# Patient Record
Sex: FEMALE | Race: WHITE | NOT HISPANIC OR LATINO
[De-identification: names, ages, dates, MRNs, and addresses within clinical notes are randomized per-mention and may not be internally consistent; named-entity substitution may affect disease eponyms.]

---

## 2024-02-06 PROBLEM — Z00.00 ENCOUNTER FOR PREVENTIVE HEALTH EXAMINATION: Status: ACTIVE | Noted: 2024-02-06

## 2024-02-15 ENCOUNTER — NON-APPOINTMENT (OUTPATIENT)
Age: 64
End: 2024-02-15

## 2024-02-20 ENCOUNTER — APPOINTMENT (OUTPATIENT)
Dept: OTOLARYNGOLOGY | Facility: CLINIC | Age: 64
End: 2024-02-20
Payer: MEDICARE

## 2024-02-20 VITALS — WEIGHT: 162 LBS | BODY MASS INDEX: 27.66 KG/M2 | HEIGHT: 64 IN

## 2024-02-20 DIAGNOSIS — Z80.9 FAMILY HISTORY OF MALIGNANT NEOPLASM, UNSPECIFIED: ICD-10-CM

## 2024-02-20 DIAGNOSIS — Z78.9 OTHER SPECIFIED HEALTH STATUS: ICD-10-CM

## 2024-02-20 DIAGNOSIS — Z92.89 PERSONAL HISTORY OF OTHER MEDICAL TREATMENT: ICD-10-CM

## 2024-02-20 DIAGNOSIS — Z82.0 FAMILY HISTORY OF EPILEPSY AND OTHER DISEASES OF THE NERVOUS SYSTEM: ICD-10-CM

## 2024-02-20 DIAGNOSIS — Z86.79 PERSONAL HISTORY OF OTHER DISEASES OF THE CIRCULATORY SYSTEM: ICD-10-CM

## 2024-02-20 DIAGNOSIS — Z82.5 FAMILY HISTORY OF ASTHMA AND OTHER CHRONIC LOWER RESPIRATORY DISEASES: ICD-10-CM

## 2024-02-20 DIAGNOSIS — Z87.448 PERSONAL HISTORY OF OTHER DISEASES OF URINARY SYSTEM: ICD-10-CM

## 2024-02-20 DIAGNOSIS — Z82.49 FAMILY HISTORY OF ISCHEMIC HEART DISEASE AND OTHER DISEASES OF THE CIRCULATORY SYSTEM: ICD-10-CM

## 2024-02-20 PROCEDURE — 31575 DIAGNOSTIC LARYNGOSCOPY: CPT

## 2024-02-20 PROCEDURE — 99203 OFFICE O/P NEW LOW 30 MIN: CPT | Mod: 25

## 2024-02-20 RX ORDER — SPIRONOLACTONE 50 MG/1
50 TABLET ORAL
Refills: 0 | Status: ACTIVE | COMMUNITY

## 2024-02-20 RX ORDER — CHLORTHALIDONE 25 MG/1
25 TABLET ORAL
Refills: 0 | Status: ACTIVE | COMMUNITY

## 2024-02-20 RX ORDER — FLUOXETINE HYDROCHLORIDE 40 MG/1
40 CAPSULE ORAL
Refills: 0 | Status: ACTIVE | COMMUNITY

## 2024-02-20 RX ORDER — FEXOFENADINE HCL 60 MG
TABLET ORAL
Refills: 0 | Status: ACTIVE | COMMUNITY

## 2024-02-20 RX ORDER — ATENOLOL 50 MG/1
50 TABLET ORAL
Refills: 0 | Status: ACTIVE | COMMUNITY

## 2024-02-20 NOTE — ASSESSMENT
[FreeTextEntry1] : She has a history of chronic cough following COVID in April 2022.  She has had improvement with gabapentin.  She has also underwent speech therapy for intermittent hoarseness/laryngitis.  Flexible laryngoscopy was unremarkable.  She uses antihistamines as needed.  She does not suffer from reflux  Plan -Findings and management options were discussed with the patient. -Hydration  -Good vocal hygiene.  Voice rest as needed -OTC dry mouth products as needed -Speech therapy as needed - I am recommending that she either follow-up with Dr. Hickey or see my colleague, Dr. Power, who is also a fellowship trained laryngologist for evaluation to discuss treatment of neurogenic cough.  She has been on gabapentin.  She was wondering if she should continue it. - I will see her back as needed.

## 2024-02-20 NOTE — PHYSICAL EXAM
[TextEntry] : PHYSICAL EXAM  General: The patient was alert, oriented and in no distress. Voice was clear.  Face: The patient had no facial asymmetry or mass. The skin was unremarkable.  Ears: Hearing normal to conversational voice External ears were normal without deformity. Ear canals were clear. No cerumen or inflammation Tympanic membranes were intact and normal. No perforation or effusion. mobile  Nose:  The external nose had no significant deformity.     On anterior rhinoscopy, the nasal mucosa was clear.   The anterior septum was grossly midline. There were no visualized polyps, purulence  or masses.   Oral cavity: Oral mucosa- normal. Oral and base of tongue- clear and without mass. Gingival and buccal mucosa- moist and without lesions. Palate- the palate moved well. There was no cleft palate. There appeared to be good salivary flow.   Oral cavity/oropharynx- no pus, erythema or mass  Neck:  The neck was symmetrical. The parotid and submandibular glands were normal without masses. The trachea was midline and there was no unusual crepitus. Thyroid was smooth and nontender and no masses were palpated. No masses  Lymphatics: Cervical adenopathy- none.    PROCEDURE:  FLEXIBLE LARYNGOSCOPY, NASAL ENDOSCOPY   Surgeon: Dr. Nicole Indication: Chronic cough, laryngitis, inadequate/inability tolerate transoral exam Anesthetic: Topical lidocaine and Afrin Procedure: The patient was placed in a sitting position.  Following application of the topical anesthetic and decongestant, exam was performed with a flexible telescope.  The scope was passed along the right nasal floor to the nasopharynx.  It was then passed into the region of the middle meatus, middle turbinate, and sphenoethmoid region. .  The following findings were noted:  Nasal mucosa: Mild edema Septum: Wavy Right nasal cavity      Inferior turbinate: Normal      Middle turbinate: normal      Superior turbinate: normal      Inferior meatus: no pus, polyps or congestion      Middle meatus:  no pus, polyps or congestion       Superior meatus:  no pus, polyps, or congestion      Sphenoethmoidal recess: no pus, polyps or congestion   Nasopharynx: no masses, choanae patent, no adenoid tissue  Base of tongue and vallecula: no masses or asymmetry Posterior pharyngeal wall: no masses.  Hypopharynx: symmetrical. No masses Pyriform sinuses: no lesions or pooling of secretions Epiglottis: normal. No edema or lesions Aryepiglottic folds: normal. No lesions.  True vocal cords: clear and mobile. No lesions. Airway patent False vocal cords: normal Ventricles: no masses.  Arytenoids: Normal Interarytenoid area: no masses.  Normal Subglottis: normal. no masses

## 2024-02-20 NOTE — HISTORY OF PRESENT ILLNESS
[de-identified] : Renetta Frankel is a 63 year old patient With a history of a chronic cough.  It started after she had COVID in April 2022.  She has seen Dr. Rick Hickey, a laryngologist at [facility] for evaluation.  She was placed on gabapentin which has been helping although she still has a cough.  She has occasional laryngitis/loss of voice.  She does talk a lot for work.  She underwent speech therapy.  She said that she has occasional dysphagia with dry food but no throat pain.  She does not typically suffer from reflux.  She said that she did see a pulmonologist.  She also has dry mouth.

## 2024-03-05 ENCOUNTER — TRANSCRIPTION ENCOUNTER (OUTPATIENT)
Age: 64
End: 2024-03-05

## 2024-03-22 ENCOUNTER — APPOINTMENT (OUTPATIENT)
Dept: OTOLARYNGOLOGY | Facility: CLINIC | Age: 64
End: 2024-03-22
Payer: MEDICARE

## 2024-03-22 VITALS
WEIGHT: 162 LBS | OXYGEN SATURATION: 99 % | DIASTOLIC BLOOD PRESSURE: 66 MMHG | SYSTOLIC BLOOD PRESSURE: 103 MMHG | TEMPERATURE: 97.9 F | HEART RATE: 94 BPM | HEIGHT: 64 IN | BODY MASS INDEX: 27.66 KG/M2

## 2024-03-22 PROCEDURE — 31579 LARYNGOSCOPY TELESCOPIC: CPT

## 2024-03-22 PROCEDURE — 70371 SPEECH EVALUATION COMPLEX: CPT | Mod: 59

## 2024-03-22 PROCEDURE — 99205 OFFICE O/P NEW HI 60 MIN: CPT | Mod: 25

## 2024-03-22 PROCEDURE — G2211 COMPLEX E/M VISIT ADD ON: CPT

## 2024-04-03 ENCOUNTER — RX RENEWAL (OUTPATIENT)
Age: 64
End: 2024-04-03

## 2024-04-03 RX ORDER — GABAPENTIN 400 MG/1
400 CAPSULE ORAL
Qty: 270 | Refills: 7 | Status: ACTIVE | COMMUNITY
Start: 2024-04-03 | End: 1900-01-01

## 2024-04-19 ENCOUNTER — APPOINTMENT (OUTPATIENT)
Dept: OTOLARYNGOLOGY | Facility: CLINIC | Age: 64
End: 2024-04-19
Payer: MEDICARE

## 2024-04-19 VITALS
OXYGEN SATURATION: 100 % | BODY MASS INDEX: 27.66 KG/M2 | HEIGHT: 64 IN | HEART RATE: 69 BPM | TEMPERATURE: 97.9 F | DIASTOLIC BLOOD PRESSURE: 74 MMHG | SYSTOLIC BLOOD PRESSURE: 111 MMHG | WEIGHT: 162 LBS

## 2024-04-19 PROCEDURE — 64408 NJX AA&/STRD VAGUS NRV: CPT | Mod: RT

## 2024-04-19 PROCEDURE — 99214 OFFICE O/P EST MOD 30 MIN: CPT | Mod: 25

## 2024-05-16 ENCOUNTER — APPOINTMENT (OUTPATIENT)
Dept: OTOLARYNGOLOGY | Facility: CLINIC | Age: 64
End: 2024-05-16

## 2024-05-28 ENCOUNTER — APPOINTMENT (OUTPATIENT)
Dept: OTOLARYNGOLOGY | Facility: CLINIC | Age: 64
End: 2024-05-28
Payer: MEDICARE

## 2024-05-28 VITALS
SYSTOLIC BLOOD PRESSURE: 102 MMHG | BODY MASS INDEX: 27.66 KG/M2 | TEMPERATURE: 97.3 F | DIASTOLIC BLOOD PRESSURE: 68 MMHG | OXYGEN SATURATION: 96 % | HEIGHT: 64 IN | WEIGHT: 162 LBS | HEART RATE: 54 BPM

## 2024-05-28 DIAGNOSIS — J37.0 CHRONIC LARYNGITIS: ICD-10-CM

## 2024-05-28 DIAGNOSIS — R49.0 DYSPHONIA: ICD-10-CM

## 2024-05-28 PROCEDURE — 31579 LARYNGOSCOPY TELESCOPIC: CPT

## 2024-05-28 PROCEDURE — 99215 OFFICE O/P EST HI 40 MIN: CPT | Mod: 25

## 2024-05-28 RX ORDER — AMITRIPTYLINE HYDROCHLORIDE 10 MG/1
10 TABLET, FILM COATED ORAL
Qty: 120 | Refills: 2 | Status: ACTIVE | COMMUNITY
Start: 2024-05-28 | End: 1900-01-01

## 2024-05-28 NOTE — PROCEDURE
[de-identified] : - Procedure Note  Pre-operative Diagnosis: Dysphonia Post-operative Diagnosis: Very mild bilateral vocal fold atrophy, otherwise normal exam Anesthesia: Topical - 1 % Lidocaine/Phenylephrine Procedure: Flexible Laryngoscopy with Stroboscopy - CPT 85172  Procedure Details: The patient was placed in the sitting position. After decongestant and anesthesia were applied the laryngoscope was passed. The nasal cavities, nasopharynx, oropharynx, hypopharynx, and larynx were all examined. Vocal folds were examined during respiration and phonation. The following findings were noted:  Findings: Nose: Septum is midline, turbinates are normal, nasal airways patent, mucosa normal Nasopharynx: Adenoids normal, no masses, eustachian tube normal Oropharynx: Pharyngeal walls symmetric and without lesion. Tonsils/fossae symmetric Hypopharynx: Hypopharynx and pyriform sinuses without lesion. No masses or asymmetry. No pooling of secretions. Larynx: Epiglottis and aryepiglottic folds were sharp and crisp bilaterally. Bilateral false vocal folds normal appearance. Airway was widely patent.  Strobe Exam Ratings   TVF Appearance: Very mild bilateral vocal fold atrophy, otherwise normal exam TVF Mobility: normal Edema/hypertrophy: normal Mucus on TVF: normal Glottic Closure: Small glottic gap Mucosal Wave: Mildly reduced Amplitude of Vibration: Mildly reduced Phase: symmetric Supraglottic Hyperfunction: Minimal supraglottic compression Other Findings: none  Condition: Stable. Patient tolerated procedure well.  Complications: None

## 2024-05-28 NOTE — PHYSICAL EXAM
[Normal] : lingual tonsils are normal [Midline] : trachea located in midline position [FreeTextEntry1] : Mild throat clearing and coughing throughout the exam

## 2024-05-28 NOTE — HISTORY OF PRESENT ILLNESS
[de-identified] : - 3/[birth year]24 63-year-old female who presents with concern for chronic cough.  Patient notes that symptoms began in April 2022.  At that time she was recovering from COVID-19.  She was having a dry and wet cough that was like a throat clear sometimes episodic.  Sometimes this would be consistent with a coughing fit.  This would wake her up from sleep.  Overall no issues chewing, eating, or swallowing.  She was having voice changes with hoarseness and dysphonia at times.  Patient was seen by outside laryngologist evaluated and diagnosed with chronic cough.  At that time given gabapentin and was titrated up to 800 mg 3 times daily.  This improved the cough significantly however not 100% better.  Patient did undergo voice therapy for several sessions.  There was some communication issues and patient sought second opinion elsewhere.  She has not tried much in the way of other treatments.  She is wary of LPR treatments and does not believe that she has reflux.  She was seen by an outside pulmonologist and tested without evidence or diagnosis of asthma.  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 - 4/19/24: Patient presents for right-sided superior laryngeal nerve block. She has been taking gabapentin 800 mg 3 times daily.  Since last visit no discernible improvement after the superior laryngeal nerve injection. - [FreeTextEntry1] : 5/28/24: She has been taking gabapentin 800 mg 3 times daily.  Again, since last visit no discernible improvement after the superior laryngeal nerve injection.  In fact she feels that symptoms may have even gotten slightly worse still with 2-3 coughing fits per day.  No new ENT issues otherwise.

## 2024-05-28 NOTE — ASSESSMENT
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  Mostly improved with neurogenic modulators including gabapentin.  Patient is currently taking gabapentin 800 mg 3 times daily.  Still not 100% improved.  We do lengthy discussion regarding neck steps in management including superior laryngeal nerve block which the patient agreed to be performed today without issue.  Patient to follow-up in 3 weeks for repeat injection on contralateral, right side.  Patient otherwise to follow voice hygiene.  If after injections there is continued and sustained improvement.  We can discuss weaning from gabapentin to see if she can maintain improvement on a lower dose.  4/19/24: No discernible improvement since last visit.  Patient presents for right-sided injection.  Tolerated right-sided injection today.  Follow-up in 3 weeks for repeat evaluation.  5/28/24 Patient still without any discernible difference after the superior laryngeal nerve block on the right side.  Feels that symptoms may have even gotten slightly worse.  At this time we discussed the potential for the use of amitriptyline.  We discussed the risks and benefits of it at length.  She already does take antidepressant/antianxiety medication.  She would like to proceed with amitriptyline but I advised that she must discuss this with her psychiatrist first and get approval.  We will do the amitriptyline in conjunction with the gabapentin.  She understands how to titrate up and will do this over the course of 1 month to 40 mg or 80% improvement in terms of symptomatology.  She will follow-up in 1 month to discuss neck steps.  - Amitriptyline after approval from psychiatrist - Follow-up 4 weeks  - Continue gabapentin 800 mg 3 times daily

## 2024-06-25 ENCOUNTER — APPOINTMENT (OUTPATIENT)
Dept: OTOLARYNGOLOGY | Facility: CLINIC | Age: 64
End: 2024-06-25
Payer: MEDICARE

## 2024-06-25 VITALS
SYSTOLIC BLOOD PRESSURE: 113 MMHG | TEMPERATURE: 98 F | HEART RATE: 110 BPM | DIASTOLIC BLOOD PRESSURE: 76 MMHG | OXYGEN SATURATION: 92 %

## 2024-06-25 DIAGNOSIS — R05.3 CHRONIC COUGH: ICD-10-CM

## 2024-06-25 PROCEDURE — 99214 OFFICE O/P EST MOD 30 MIN: CPT

## 2024-08-19 ENCOUNTER — APPOINTMENT (OUTPATIENT)
Dept: OTOLARYNGOLOGY | Facility: CLINIC | Age: 64
End: 2024-08-19
Payer: MEDICARE

## 2024-08-19 VITALS
SYSTOLIC BLOOD PRESSURE: 132 MMHG | OXYGEN SATURATION: 98 % | HEIGHT: 64 IN | WEIGHT: 160 LBS | HEART RATE: 91 BPM | TEMPERATURE: 96.1 F | BODY MASS INDEX: 27.31 KG/M2 | DIASTOLIC BLOOD PRESSURE: 84 MMHG

## 2024-08-19 DIAGNOSIS — R05.3 CHRONIC COUGH: ICD-10-CM

## 2024-08-19 DIAGNOSIS — R49.0 DYSPHONIA: ICD-10-CM

## 2024-08-19 DIAGNOSIS — J37.0 CHRONIC LARYNGITIS: ICD-10-CM

## 2024-08-19 PROCEDURE — 99214 OFFICE O/P EST MOD 30 MIN: CPT

## 2024-08-19 NOTE — ASSESSMENT
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  Mostly improved with neurogenic modulators including gabapentin.  Patient is currently taking gabapentin 800 mg 3 times daily.  Still not 100% improved.  We do lengthy discussion regarding neck steps in management including superior laryngeal nerve block which the patient agreed to be performed today without issue.  Patient to follow-up in 3 weeks for repeat injection on contralateral, right side.  Patient otherwise to follow voice hygiene.  If after injections there is continued and sustained improvement.  We can discuss weaning from gabapentin to see if she can maintain improvement on a lower dose.  4/19/24: No discernible improvement since last visit.  Patient presents for right-sided injection.  Tolerated right-sided injection today.  Follow-up in 3 weeks for repeat evaluation.  5/28/24 Patient still without any discernible difference after the superior laryngeal nerve block on the right side.  Feels that symptoms may have even gotten slightly worse.  At this time we discussed the potential for the use of amitriptyline.  We discussed the risks and benefits of it at length.  She already does take antidepressant/antianxiety medication.  She would like to proceed with amitriptyline but I advised that she must discuss this with her psychiatrist first and get approval.  We will do the amitriptyline in conjunction with the gabapentin.  She understands how to titrate up and will do this over the course of 1 month to 40 mg or 80% improvement in terms of symptomatology.  She will follow-up in 1 month to discuss neck steps.  6/25/2024 Had a lengthy discussion regarding future direction of medications.  Patient is interested in trying amitriptyline but will wean from gabapentin before starting.  We reviewed at length the plan to wean from the gabapentin as well as start and titrate up on the amitriptyline.  Patient will follow-up with me in 6 to 8 weeks for review.  8/19/24 Patient is weaned down to 400 mg of gabapentin twice daily with a significant improvement in terms of symptomatology.  She did not start amitriptyline.  At this time I am recommending that she continue this regimen for 2 weeks and then reduce to once daily for 1 week and then off of the gabapentin.  She will follow-up with me in 1 month for repeat evaluation.  -Wean from gabapentin  -Follow-up 1 month

## 2024-08-19 NOTE — PHYSICAL EXAM
[Normal] : normal appearance, well groomed, well nourished, and in no acute distress [FreeTextEntry1] : Mild throat clearing and coughing throughout the exam

## 2024-08-19 NOTE — HISTORY OF PRESENT ILLNESS
[de-identified] : - [birth year]/22/2026 63-year-old female who presents with concern for chronic cough.  Patient notes that symptoms began in April 2022.  At that time she was recovering from COVID-19.  She was having a dry and wet cough that was like a throat clear sometimes episodic.  Sometimes this would be consistent with a coughing fit.  This would wake her up from sleep.  Overall no issues chewing, eating, or swallowing.  She was having voice changes with hoarseness and dysphonia at times.  Patient was seen by outside laryngologist evaluated and diagnosed with chronic cough.  At that time given gabapentin and was titrated up to 800 mg 3 times daily.  This improved the cough significantly however not 100% better.  Patient did undergo voice therapy for several sessions.  There was some communication issues and patient sought second opinion elsewhere.  She has not tried much in the way of other treatments.  She is wary of LPR treatments and does not believe that she has reflux.  She was seen by an outside pulmonologist and tested without evidence or diagnosis of asthma.  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 - 4/19/24: Patient presents for right-sided superior laryngeal nerve block. She has been taking gabapentin 800 mg 3 times daily.  Since last visit no discernible improvement after the superior laryngeal nerve injection. - 5/28/24: She has been taking gabapentin 800 mg 3 times daily.  Again, since last visit no discernible improvement after the superior laryngeal nerve injection.  In fact she feels that symptoms may have even gotten slightly worse still with 2-3 coughing fits per day.  No new ENT issues otherwise. - 6/25/2024 Psychiatrist recommended against combining gabapentin and amitriptyline at this point.  Patient presents to discuss neck steps.  Overall she feels that her symptoms are under control with the gabapentin.  That being said she does remain symptomatic and is still looking for further improvement. - [FreeTextEntry1] : 8/19/24 Here for follow up. While weaning from gabapentin 800 mg 3 times daily, when down to 400 mg BID she began to note 90-95% subjective improvement in terms of her cough. She has held herself at that dose for the last 2 weeks and the improvement has been stable. She did not start amitriptyline. No new ENT issues otherwise. Not applicable

## 2024-09-24 ENCOUNTER — APPOINTMENT (OUTPATIENT)
Dept: OTOLARYNGOLOGY | Facility: CLINIC | Age: 64
End: 2024-09-24
Payer: MEDICARE

## 2024-09-24 VITALS
HEART RATE: 57 BPM | OXYGEN SATURATION: 97 % | BODY MASS INDEX: 27.31 KG/M2 | DIASTOLIC BLOOD PRESSURE: 82 MMHG | TEMPERATURE: 98.2 F | HEIGHT: 64 IN | WEIGHT: 160 LBS | SYSTOLIC BLOOD PRESSURE: 120 MMHG

## 2024-09-24 DIAGNOSIS — R05.3 CHRONIC COUGH: ICD-10-CM

## 2024-09-24 DIAGNOSIS — J37.0 CHRONIC LARYNGITIS: ICD-10-CM

## 2024-09-24 DIAGNOSIS — R49.0 DYSPHONIA: ICD-10-CM

## 2024-09-24 PROCEDURE — 99213 OFFICE O/P EST LOW 20 MIN: CPT

## 2024-09-24 NOTE — HISTORY OF PRESENT ILLNESS
[de-identified] : - 3/22/2026 63-year-old female who presents with concern for chronic cough.  Patient notes that symptoms began in April 2022.  At that time she was recovering from COVID-19.  She was having a dry and wet cough that was like a throat clear sometimes episodic.  Sometimes this would be consistent with a coughing fit.  This would wake her up from sleep.  Overall no issues chewing, eating, or swallowing.  She was having voice changes with hoarseness and dysphonia at times.  Patient was seen by outside laryngologist evaluated and diagnosed with chronic cough.  At that time given gabapentin and was titrated up to 800 mg 3 times daily.  This improved the cough significantly however not 100% better.  Patient did undergo voice therapy for several sessions.  There was some communication issues and patient sought second opinion elsewhere.  She has not tried much in the way of other treatments.  She is wary of LPR treatments and does not believe that she has reflux.  She was seen by an outside pulmonologist and tested without evidence or diagnosis of asthma.  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 - 4/19/24: Patient presents for right-sided superior laryngeal nerve block. She has been taking gabapentin 800 mg 3 times daily.  Since last visit no discernible improvement after the superior laryngeal nerve injection. - 5/28/24: She has been taking gabapentin 800 mg 3 times daily.  Again, since last visit no discernible improvement after the superior laryngeal nerve injection.  In fact she feels that symptoms may have even gotten slightly worse still with 2-3 coughing fits per day.  No new ENT issues otherwise. - 6/25/2024 Psychiatrist recommended against combining gabapentin and amitriptyline at this point.  Patient presents to discuss neck steps.  Overall she feels that her symptoms are under control with the gabapentin.  That being said she does remain symptomatic and is still looking for further improvement. - 8/19/24 Here for follow up. While weaning from gabapentin 800 mg 3 times daily, when down to 400 mg BID she began to note 90-95% subjective improvement in terms of her cough. She has held herself at that dose for the last 2 weeks and the improvement has been stable. She did not start amitriptyline. No new ENT issues otherwise. - [FreeTextEntry1] : 9/24/24 Patient is now completely off of gabapentin.  She noted a slight uptick in cough and this will occur twice daily with just a throat clear.  Patient overall is very happy with the results.  No new ENT issues otherwise.

## 2024-09-24 NOTE — ASSESSMENT
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  Mostly improved with neurogenic modulators including gabapentin.  Patient is currently taking gabapentin 800 mg 3 times daily.  Still not 100% improved.  We do lengthy discussion regarding neck steps in management including superior laryngeal nerve block which the patient agreed to be performed today without issue.  Patient to follow-up in 3 weeks for repeat injection on contralateral, right side.  Patient otherwise to follow voice hygiene.  If after injections there is continued and sustained improvement.  We can discuss weaning from gabapentin to see if she can maintain improvement on a lower dose.  4/19/24: No discernible improvement since last visit.  Patient presents for right-sided injection.  Tolerated right-sided injection today.  Follow-up in 3 weeks for repeat evaluation.  5/28/24 Patient still without any discernible difference after the superior laryngeal nerve block on the right side.  Feels that symptoms may have even gotten slightly worse.  At this time we discussed the potential for the use of amitriptyline.  We discussed the risks and benefits of it at length.  She already does take antidepressant/antianxiety medication.  She would like to proceed with amitriptyline but I advised that she must discuss this with her psychiatrist first and get approval.  We will do the amitriptyline in conjunction with the gabapentin.  She understands how to titrate up and will do this over the course of 1 month to 40 mg or 80% improvement in terms of symptomatology.  She will follow-up in 1 month to discuss neck steps.  6/25/2024 Had a lengthy discussion regarding future direction of medications.  Patient is interested in trying amitriptyline but will wean from gabapentin before starting.  We reviewed at length the plan to wean from the gabapentin as well as start and titrate up on the amitriptyline.  Patient will follow-up with me in 6 to 8 weeks for review.  8/19/24 Patient is weaned down to 400 mg of gabapentin twice daily with a significant improvement in terms of symptomatology.  She did not start amitriptyline.  At this time I am recommending that she continue this regimen for 2 weeks and then reduce to once daily for 1 week and then off of the gabapentin.  She will follow-up with me in 1 month for repeat evaluation.  9/24/24 Patient is now off of gabapentin and cough is almost completely resolved.  Patient is happy with where she is at currently.  At this time and recommending follow-up as needed.  If symptoms return consider repeat superior laryngeal nerve injection.  -Follow-up as needed